# Patient Record
Sex: MALE | Race: WHITE
[De-identification: names, ages, dates, MRNs, and addresses within clinical notes are randomized per-mention and may not be internally consistent; named-entity substitution may affect disease eponyms.]

---

## 2018-01-15 ENCOUNTER — HOSPITAL ENCOUNTER (EMERGENCY)
Dept: HOSPITAL 17 - PHED | Age: 38
LOS: 1 days | Discharge: HOME | End: 2018-01-16
Payer: SELF-PAY

## 2018-01-15 VITALS
OXYGEN SATURATION: 97 % | SYSTOLIC BLOOD PRESSURE: 140 MMHG | HEART RATE: 96 BPM | DIASTOLIC BLOOD PRESSURE: 78 MMHG | RESPIRATION RATE: 18 BRPM

## 2018-01-15 VITALS
OXYGEN SATURATION: 98 % | RESPIRATION RATE: 18 BRPM | HEART RATE: 88 BPM | SYSTOLIC BLOOD PRESSURE: 135 MMHG | DIASTOLIC BLOOD PRESSURE: 90 MMHG

## 2018-01-15 VITALS
TEMPERATURE: 98.6 F | RESPIRATION RATE: 18 BRPM | SYSTOLIC BLOOD PRESSURE: 140 MMHG | HEART RATE: 96 BPM | DIASTOLIC BLOOD PRESSURE: 78 MMHG | OXYGEN SATURATION: 98 %

## 2018-01-15 VITALS
HEART RATE: 81 BPM | SYSTOLIC BLOOD PRESSURE: 135 MMHG | RESPIRATION RATE: 18 BRPM | OXYGEN SATURATION: 97 % | DIASTOLIC BLOOD PRESSURE: 90 MMHG

## 2018-01-15 VITALS — WEIGHT: 192.46 LBS | HEIGHT: 70 IN | BODY MASS INDEX: 27.55 KG/M2

## 2018-01-15 DIAGNOSIS — F98.8: ICD-10-CM

## 2018-01-15 DIAGNOSIS — R07.89: Primary | ICD-10-CM

## 2018-01-15 DIAGNOSIS — Z79.899: ICD-10-CM

## 2018-01-15 DIAGNOSIS — R94.31: ICD-10-CM

## 2018-01-15 LAB
BASOPHILS # BLD AUTO: 0 TH/MM3 (ref 0–0.2)
BASOPHILS NFR BLD: 0.3 % (ref 0–2)
BUN SERPL-MCNC: 18 MG/DL (ref 7–18)
CALCIUM SERPL-MCNC: 8.6 MG/DL (ref 8.5–10.1)
CHLORIDE SERPL-SCNC: 101 MEQ/L (ref 98–107)
CREAT SERPL-MCNC: 1.4 MG/DL (ref 0.6–1.3)
EOSINOPHIL # BLD: 0.1 TH/MM3 (ref 0–0.4)
EOSINOPHIL NFR BLD: 0.9 % (ref 0–4)
ERYTHROCYTE [DISTWIDTH] IN BLOOD BY AUTOMATED COUNT: 12.7 % (ref 11.6–17.2)
GFR SERPLBLD BASED ON 1.73 SQ M-ARVRAT: 57 ML/MIN (ref 89–?)
GLUCOSE SERPL-MCNC: 119 MG/DL (ref 74–106)
HCO3 BLD-SCNC: 28 MEQ/L (ref 21–32)
HCT VFR BLD CALC: 45.7 % (ref 39–51)
HGB BLD-MCNC: 14.9 GM/DL (ref 13–17)
INR PPP: 1 RATIO
LIPASE: 127 U/L (ref 73–393)
LYMPHOCYTES # BLD AUTO: 2.4 TH/MM3 (ref 1–4.8)
LYMPHOCYTES NFR BLD AUTO: 31.7 % (ref 9–44)
MAGNESIUM SERPL-MCNC: 2 MG/DL (ref 1.5–2.5)
MCH RBC QN AUTO: 28.8 PG (ref 27–34)
MCHC RBC AUTO-ENTMCNC: 32.6 % (ref 32–36)
MCV RBC AUTO: 88.1 FL (ref 80–100)
MONOCYTE #: 0.6 TH/MM3 (ref 0–0.9)
MONOCYTES NFR BLD: 7.7 % (ref 0–8)
NEUTROPHILS # BLD AUTO: 4.6 TH/MM3 (ref 1.8–7.7)
NEUTROPHILS NFR BLD AUTO: 59.4 % (ref 16–70)
PLATELET # BLD: 305 TH/MM3 (ref 150–450)
PMV BLD AUTO: 7.6 FL (ref 7–11)
PROTHROMBIN TIME: 10.2 SEC (ref 9.8–11.6)
RBC # BLD AUTO: 5.19 MIL/MM3 (ref 4.5–5.9)
SODIUM SERPL-SCNC: 136 MEQ/L (ref 136–145)
TROPONIN I SERPL-MCNC: (no result) NG/ML (ref 0.02–0.05)
WBC # BLD AUTO: 7.7 TH/MM3 (ref 4–11)

## 2018-01-15 PROCEDURE — 83735 ASSAY OF MAGNESIUM: CPT

## 2018-01-15 PROCEDURE — 82552 ASSAY OF CPK IN BLOOD: CPT

## 2018-01-15 PROCEDURE — 80048 BASIC METABOLIC PNL TOTAL CA: CPT

## 2018-01-15 PROCEDURE — 85730 THROMBOPLASTIN TIME PARTIAL: CPT

## 2018-01-15 PROCEDURE — 83690 ASSAY OF LIPASE: CPT

## 2018-01-15 PROCEDURE — 82550 ASSAY OF CK (CPK): CPT

## 2018-01-15 PROCEDURE — 71045 X-RAY EXAM CHEST 1 VIEW: CPT

## 2018-01-15 PROCEDURE — 85025 COMPLETE CBC W/AUTO DIFF WBC: CPT

## 2018-01-15 PROCEDURE — 93005 ELECTROCARDIOGRAM TRACING: CPT

## 2018-01-15 PROCEDURE — 84484 ASSAY OF TROPONIN QUANT: CPT

## 2018-01-15 PROCEDURE — 85610 PROTHROMBIN TIME: CPT

## 2018-01-15 NOTE — PD
HPI


Chief Complaint:  Cardiac Complaint


Time Seen by Provider:  22:50


Travel History


International Travel<30 days:  No


Contact w/Intl Traveler<30days:  No


Traveled to known affect area:  No





History of Present Illness


HPI


38 year-old male presents to the emergency department by private transportation 

for one hour of left-sided chest discomfort 1/10 in intensity and a funny 

feeling in his left arm.  Patient has history of ADD and takes Adderall.  

Patient does not smoke cigarettes does drink alcohol daily and occasionally 

uses marijuana.  Patient's had no recent injury or fall.  Patient is unable to 

identify exacerbating or alleviating factors.  Patient does have some 

associated shortness of breath but does not describe pleuritic pain.  Patient 

has had no fever or chills.  No report of long distance travel protracted 

bedrest her surgical procedure.  Patient's had symptoms like this before 

although not as long in duration.  Patient has been discussing with his 

provider tapering off of Adderall for these concerned that it may be causing 

him to have funny symptoms.  Patient was splinted take aspirin prior to arrival 

but did not have any.  No sweats no nausea no vomiting no referred pain to the 

neck jaw back and scapular right upper extremity or abdomen.  Patient denies 

history of hypertension dyslipidemia diabetes or tobaccoism.  No family history 

of premature onset cardiac disease or clotting disorder.  Patient reports 

experiencing a very stressful day as he had to drive over from [place] for 

 [facility].





[facility]


Past Medical History


*** Narrative Medical


ADD reconstructive surgery right foot; alcohol use marijuana use no tobacco use

; no FH premature onset CAD; nursing notes reviewed





Past Surgical History


*** Narrative Surgical


nursing notes reviewed





Social History


Tobacco Use:  No (none x 20 years)





Allergies-Medications


(Allergen,Severity, Reaction):  


Coded Allergies:  


     No Known Allergies (Unverified , 1/15/18)


Reported Meds & Prescriptions





Reported Meds & Active Scripts


Active


Reported


Adderall (Amphetamine-Dextroamphetamine) 15 Mg Tab 15 Mg PO BID


     Avoid late evening doses. Space doses at least 4 to 6 hours if more


     than once/day dosing.








Review of Systems


Except as stated in HPI:  all other systems reviewed are Neg


General / Constitutional:  No: Fever, Chills


HENT:  No: Headaches, Congestion


Cardiovascular:  Positive: Chest Pain or Discomfort


Respiratory:  Positive: Shortness of Breath


Gastrointestinal:  No: Nausea, Vomiting, Abdominal Pain


Genitourinary:  No: Flank Pain


Musculoskeletal:  No: Myalgias, Arthralgias


Skin:  No Rash


Neurologic:  No: Weakness, Dizziness


Psychiatric:  No: Anxiety


Hematologic/Lymphatic:  No: Lymph Node Enlargement





Physical Exam


Narrative


GENERAL: Well-developed well-nourished male in no acute distress no respiratory 

distress


SKIN: Warm and dry.


HEAD: Normocephalic.


EYES: No scleral icterus. No injection or drainage. 


NECK: Supple, trachea midline. No JVD or lymphadenopathy.


CARDIOVASCULAR: Regular rate and rhythm without murmurs, gallops, or rubs. 


RESPIRATORY: Breath sounds equal bilaterally. No accessory muscle use.


GASTROINTESTINAL: Abdomen soft, non-tender, nondistended. 


MUSCULOSKELETAL: No cyanosis, or edema. 


BACK: Nontender without obvious deformity. No CVA tenderness.








Data


Data


Last Documented VS





Vital Signs








  Date Time  Temp Pulse Resp B/P (MAP) Pulse Ox O2 Delivery O2 Flow Rate FiO2


 


18 00:15        


 


18 00:00  85 18  100 Room Air  


 


1/15/18 22:37 98.6       








Orders





 Orders


Electrocardiogram (1/15/18 22:50)


Basic Metabolic Panel (Bmp) (1/15/18 22:50)


Ckmb (Isoenzyme) Profile (1/15/18 22:50)


Complete Blood Count With Diff (1/15/18 22:50)


Magnesium (Mg) (1/15/18 22:50)


Prothrombin Time / Inr (Pt) (1/15/18 22:50)


Act Partial Throm Time (Ptt) (1/15/18 22:50)


Troponin I (1/15/18 22:50)


Lipase (1/15/18 22:50)


Chest, Single Ap (1/15/18 22:50)


Ecg Monitoring (1/15/18 22:50)


Bilateral Bp Monitoring (1/15/18 22:50)


Iv Access Insert/Monitor (1/15/18 22:50)


Oximetry (1/15/18 22:50)


Oxygen Administration (1/15/18 22:50)


Sodium Chloride 0.9% Flush (Ns Flush) (1/15/18 23:00)


Aspirin Chew (Aspirin Chew) (1/15/18 23:00)


CKMB (1/15/18 22:30)


CKMB% (1/15/18 22:30)


Ed Discharge Order (18 00:05)





Labs





Laboratory Tests








Test


  1/15/18


22:30


 


White Blood Count 7.7 TH/MM3 


 


Red Blood Count 5.19 MIL/MM3 


 


Hemoglobin 14.9 GM/DL 


 


Hematocrit 45.7 % 


 


Mean Corpuscular Volume 88.1 FL 


 


Mean Corpuscular Hemoglobin 28.8 PG 


 


Mean Corpuscular Hemoglobin


Concent 32.6 % 


 


 


Red Cell Distribution Width 12.7 % 


 


Platelet Count 305 TH/MM3 


 


Mean Platelet Volume 7.6 FL 


 


Neutrophils (%) (Auto) 59.4 % 


 


Lymphocytes (%) (Auto) 31.7 % 


 


Monocytes (%) (Auto) 7.7 % 


 


Eosinophils (%) (Auto) 0.9 % 


 


Basophils (%) (Auto) 0.3 % 


 


Neutrophils # (Auto) 4.6 TH/MM3 


 


Lymphocytes # (Auto) 2.4 TH/MM3 


 


Monocytes # (Auto) 0.6 TH/MM3 


 


Eosinophils # (Auto) 0.1 TH/MM3 


 


Basophils # (Auto) 0.0 TH/MM3 


 


CBC Comment DIFF FINAL 


 


Differential Comment  


 


Prothrombin Time 10.2 SEC 


 


Prothromb Time International


Ratio 1.0 RATIO 


 


 


Activated Partial


Thromboplast Time 27.4 SEC 


 


 


Blood Urea Nitrogen 18 MG/DL 


 


Creatinine 1.40 MG/DL 


 


Random Glucose 119 MG/DL 


 


Calcium Level 8.6 MG/DL 


 


Magnesium Level 2.0 MG/DL 


 


Sodium Level 136 MEQ/L 


 


Potassium Level 3.6 MEQ/L 


 


Chloride Level 101 MEQ/L 


 


Carbon Dioxide Level 28.0 MEQ/L 


 


Anion Gap 7 MEQ/L 


 


Estimat Glomerular Filtration


Rate 57 ML/MIN 


 


 


Total Creatine Kinase 191 U/L 


 


Creatine Kinase MB 0.9 NG/ML 


 


Troponin I


  LESS THAN 0.02


NG/ML


 


Lipase 127 U/L 











[facility]


Medical Decision Making


Medical Screen Exam Complete:  Yes


Emergency Medical Condition:  Yes


Medical Record Reviewed:  Yes


Interpretation(s)


EKG: Normal sinus rhythm rate 96 no acute ST elevation injury pattern or ectopy 

noted


Differential Diagnosis


Chest pain, ACS, MI, atypical chest pain, esophageal spasm, costochondritis, 

pleurisy, PE, pneumothorax, adverse adderall reaction


Narrative Course


Recent placed on cardiac monitor IV access obtained continues pulse oximetry in 

place EKG performed which reveals normal sinus rhythm rate 96 no acute ST 

elevation or injury pattern or ectopy noted


Specimens collected and sent for resulting


Patient given aspirin 162 mg by mouth


Patient resting comfortably waiting for his girlfriend to arrive and discomfort 

is 0/10 in intensity


Advise resulted and values found to be in normal range specifically troponin I 

is less than 0.02 and , MB 0.9 also not elevated


Patient desirous of being discharged home is asymptomatic at this time is 

encouraged follow-up with his primary care provider when he returns to [place].





Diagnosis





 Primary Impression:  


 Chest pain, atypical


Referrals:  


Primary Care Physician


2 days


Patient Instructions:  General Instructions





***Additional Instructions:  


Follow-up with your primary care provider


Return to the emergency department for any concerns or change in condition


May use low-dose aspirin 81 mg daily


***Med/Other Pt SpecificInfo:  No Change to Meds


Disposition:  01 DISCHARGE HOME


Condition:  Stable











Patti Downs MD Husam 15, 2018 22:56

## 2018-01-15 NOTE — RADRPT
EXAM DATE/TIME:  01/15/2018 22:58 

 

[facility] COMPARISON:     

No previous studies available for comparison.

 

                     

INDICATIONS :     

Chest pain.

                     

 

MEDICAL HISTORY :     

None.          

 

SURGICAL HISTORY :     

None.   

 

ENCOUNTER:     

Initial                                        

 

ACUITY:     

1 day      

 

PAIN SCORE:     

4/10

 

LOCATION:     

Left chest 

 

FINDINGS:     

A single view of the chest demonstrates the lungs to be symmetrically aerated without evidence of mas
s, infiltrate or effusion.  The cardiomediastinal contours are unremarkable.  Osseous structures are 
intact.

 

CONCLUSION:     No acute disease.  

 

 

 

 Edmond Lopez MD on January 15, 2018 at 23:19           

Board Certified Radiologist.

 This report was verified electronically.

## 2018-01-16 VITALS
RESPIRATION RATE: 18 BRPM | DIASTOLIC BLOOD PRESSURE: 69 MMHG | OXYGEN SATURATION: 100 % | HEART RATE: 85 BPM | SYSTOLIC BLOOD PRESSURE: 140 MMHG

## 2018-01-16 NOTE — EKG
Date Performed: 01/15/2018       Time Performed: 22:35:17

 

PTAGE:      38 years

 

EKG:      Sinus rhythm 

 

 NONSPECIFIC T-WAVE ABNORMALITY BORDERLINE ECG 

 

NO PREVIOUS TRACING            

 

DOCTOR:   Trisha Ashford  Interpretating Date/Time  01/16/2018 14:27:27